# Patient Record
Sex: FEMALE | Race: WHITE
[De-identification: names, ages, dates, MRNs, and addresses within clinical notes are randomized per-mention and may not be internally consistent; named-entity substitution may affect disease eponyms.]

---
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
## 2020-01-29 ENCOUNTER — HOSPITAL ENCOUNTER (EMERGENCY)
Dept: HOSPITAL 74 - JERFT | Age: 20
Discharge: HOME | End: 2020-01-29
Payer: COMMERCIAL

## 2020-01-29 VITALS — DIASTOLIC BLOOD PRESSURE: 59 MMHG | TEMPERATURE: 97.8 F | SYSTOLIC BLOOD PRESSURE: 104 MMHG | HEART RATE: 87 BPM

## 2020-01-29 VITALS — BODY MASS INDEX: 28.3 KG/M2
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
## 2020-01-29 DIAGNOSIS — S93.401A: Primary | ICD-10-CM
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
## 2020-01-29 NOTE — PDOC
History of Present Illness





- General


Chief Complaint: Pain


Stated Complaint: FALL/R/ANKLE/INJURY


Time Seen by Provider: 01/29/20 20:34





- History of Present Illness


Initial Comments: 





01/29/20 21:17


19-year-old female without comorbidities presents for evaluation of right ankle 

pain.  She describes an inversion type of injury while stepping up on the curb 

today.





Past History





- Past Medical History


Allergies/Adverse Reactions: 


 Allergies











Allergy/AdvReac Type Severity Reaction Status Date / Time


 


No Known Allergies Allergy   Verified 09/17/19 21:09











Home Medications: 


Ambulatory Orders





Acetaminophen [Tylenol -] 500 mg PO Q6H PRN #28 tablet 12/19/17 


Ibuprofen [Motrin -] 400 mg PO PRN 12/19/17 


Cephalexin [Keflex] 500 mg PO TID 5 Days #15 capsule 09/17/19 








COPD: No


GI Disorders: Yes (rectal bleed)





- Immunization History


Immunization Up to Date: Yes





- Psycho Social/Smoking Cessation Hx


Smoking History: Never smoked


Have you smoked in the past 12 months: No


Hx Alcohol Use: No


Drug/Substance Use Hx: No


Substance Use Type: None





**Review of Systems





- Review of Systems


Musculoskeletal: Yes: Joint Pain





*Physical Exam





- Vital Signs


 Last Vital Signs











Temp Pulse Resp BP Pulse Ox


 


 97.8 F   87   20   104/59 L  98 


 


 01/29/20 20:56  01/29/20 20:56  01/29/20 20:56  01/29/20 20:56  01/29/20 20:56














- Physical Exam





01/29/20 21:17


Ankle skin color and temperature normal range of motion is slightly limited.  

There is no tenderness about the proximal fibula or along its distal course.  

No tenderness about the medial lateral malleolus base of the fifth metatarsal 

or navicular.  Mild tenderness over the ATFL without instability no gross 

sensorimotor deficits neurovascular intact.





Medical Decision Making





- Medical Decision Making





01/29/20 21:17


X-rays of the right ankle show no evidence of fracture trauma destructive 

process right ankle sprain weight-bear as tolerated with crutches and Aircast 

follow-up with Ortho





Discharge





- Discharge Information


Problems reviewed: Yes


Clinical Impression/Diagnosis: 


 Right ankle pain, Right ankle sprain





Condition: Stable


Disposition: HOME





- Admission


No





- Follow up/Referral


Referrals: 


Tre Del Toro DO [Staff Physician] - 





- Patient Discharge Instructions


Additional Instructions: 


You may weight-bear as tolerated with use of crutches in the Aircast Tylenol as 

directed for pain.  Return to the emergency room for worsening symptoms.  And 

without fail please follow-up with orthopedic surgery in 1 to 2 days for 

further evaluation and treatment options.





- Post Discharge Activity

## 2020-01-29 NOTE — PDOC
Rapid Medical Evaluation


Time Seen by Provider: 01/29/20 20:34


Medical Evaluation: 


 Allergies











Allergy/AdvReac Type Severity Reaction Status Date / Time


 


No Known Allergies Allergy   Verified 09/17/19 21:09











01/29/20 20:36





CC: right ankle pain s/p trip and fall





PE: 2+ DP pulses. TTp right lateral malleolus





Orders: ice, motrin, xray





Patient will proceed to ER for further evaluation.





**Discharge Disposition





- Diagnosis


 Right ankle pain








- Referrals





- Patient Instructions





- Post Discharge Activity